# Patient Record
Sex: MALE | Race: WHITE | Employment: FULL TIME | URBAN - METROPOLITAN AREA
[De-identification: names, ages, dates, MRNs, and addresses within clinical notes are randomized per-mention and may not be internally consistent; named-entity substitution may affect disease eponyms.]

---

## 2019-12-22 ENCOUNTER — HOSPITAL ENCOUNTER (EMERGENCY)
Facility: CLINIC | Age: 24
Discharge: HOME | End: 2019-12-22
Attending: EMERGENCY MEDICINE | Admitting: EMERGENCY MEDICINE
Payer: COMMERCIAL

## 2019-12-22 ENCOUNTER — APPOINTMENT (OUTPATIENT)
Dept: CT IMAGING | Facility: CLINIC | Age: 24
End: 2019-12-22
Attending: EMERGENCY MEDICINE
Payer: COMMERCIAL

## 2019-12-22 VITALS
DIASTOLIC BLOOD PRESSURE: 52 MMHG | HEART RATE: 63 BPM | WEIGHT: 195 LBS | BODY MASS INDEX: 26.41 KG/M2 | OXYGEN SATURATION: 98 % | SYSTOLIC BLOOD PRESSURE: 124 MMHG | TEMPERATURE: 98.8 F | HEIGHT: 72 IN | RESPIRATION RATE: 16 BRPM

## 2019-12-22 DIAGNOSIS — B27.90 INFECTIOUS MONONUCLEOSIS WITHOUT COMPLICATION, INFECTIOUS MONONUCLEOSIS DUE TO UNSPECIFIED ORGANISM: ICD-10-CM

## 2019-12-22 DIAGNOSIS — J02.9 PHARYNGITIS, UNSPECIFIED ETIOLOGY: ICD-10-CM

## 2019-12-22 LAB
ALBUMIN SERPL-MCNC: 3.7 G/DL (ref 3.4–5)
ALP SERPL-CCNC: 95 U/L (ref 40–150)
ALT SERPL W P-5'-P-CCNC: 164 U/L (ref 0–70)
AST SERPL W P-5'-P-CCNC: 45 U/L (ref 0–45)
BASOPHILS # BLD AUTO: 0 10E9/L (ref 0–0.2)
BASOPHILS NFR BLD AUTO: 0 %
BILIRUB DIRECT SERPL-MCNC: 0.1 MG/DL (ref 0–0.2)
BILIRUB SERPL-MCNC: 0.6 MG/DL (ref 0.2–1.3)
DIFFERENTIAL METHOD BLD: ABNORMAL
EOSINOPHIL # BLD AUTO: 0 10E9/L (ref 0–0.7)
EOSINOPHIL NFR BLD AUTO: 0 %
ERYTHROCYTE [DISTWIDTH] IN BLOOD BY AUTOMATED COUNT: 12.4 % (ref 10–15)
HCT VFR BLD AUTO: 43.8 % (ref 40–53)
HETEROPH AB SER QL: POSITIVE
HGB BLD-MCNC: 15 G/DL (ref 13.3–17.7)
LYMPHOCYTES # BLD AUTO: 7.4 10E9/L (ref 0.8–5.3)
LYMPHOCYTES NFR BLD AUTO: 72 %
MCH RBC QN AUTO: 31.9 PG (ref 26.5–33)
MCHC RBC AUTO-ENTMCNC: 34.2 G/DL (ref 31.5–36.5)
MCV RBC AUTO: 93 FL (ref 78–100)
MONOCYTES # BLD AUTO: 0.4 10E9/L (ref 0–1.3)
MONOCYTES NFR BLD AUTO: 4 %
NEUTROPHILS # BLD AUTO: 2.5 10E9/L (ref 1.6–8.3)
NEUTROPHILS NFR BLD AUTO: 24 %
PLATELET # BLD AUTO: 231 10E9/L (ref 150–450)
PLATELET # BLD EST: ABNORMAL 10*3/UL
PROT SERPL-MCNC: 7 G/DL (ref 6.8–8.8)
RBC # BLD AUTO: 4.7 10E12/L (ref 4.4–5.9)
RBC MORPH BLD: ABNORMAL
SMUDGE CELLS BLD QL SMEAR: PRESENT
VARIANT LYMPHS BLD QL SMEAR: PRESENT
WBC # BLD AUTO: 10.3 10E9/L (ref 4–11)

## 2019-12-22 PROCEDURE — 25000128 H RX IP 250 OP 636: Performed by: EMERGENCY MEDICINE

## 2019-12-22 PROCEDURE — 25800030 ZZH RX IP 258 OP 636: Performed by: EMERGENCY MEDICINE

## 2019-12-22 PROCEDURE — 80076 HEPATIC FUNCTION PANEL: CPT | Performed by: EMERGENCY MEDICINE

## 2019-12-22 PROCEDURE — 85025 COMPLETE CBC W/AUTO DIFF WBC: CPT | Performed by: EMERGENCY MEDICINE

## 2019-12-22 PROCEDURE — 99285 EMERGENCY DEPT VISIT HI MDM: CPT | Mod: 25

## 2019-12-22 PROCEDURE — 25000132 ZZH RX MED GY IP 250 OP 250 PS 637: Performed by: EMERGENCY MEDICINE

## 2019-12-22 PROCEDURE — 70491 CT SOFT TISSUE NECK W/DYE: CPT

## 2019-12-22 PROCEDURE — 96365 THER/PROPH/DIAG IV INF INIT: CPT | Mod: 59

## 2019-12-22 PROCEDURE — 25000125 ZZHC RX 250: Performed by: EMERGENCY MEDICINE

## 2019-12-22 PROCEDURE — 86308 HETEROPHILE ANTIBODY SCREEN: CPT | Performed by: EMERGENCY MEDICINE

## 2019-12-22 PROCEDURE — 96375 TX/PRO/DX INJ NEW DRUG ADDON: CPT

## 2019-12-22 PROCEDURE — 96361 HYDRATE IV INFUSION ADD-ON: CPT

## 2019-12-22 RX ORDER — IOPAMIDOL 755 MG/ML
90 INJECTION, SOLUTION INTRAVASCULAR ONCE
Status: COMPLETED | OUTPATIENT
Start: 2019-12-22 | End: 2019-12-22

## 2019-12-22 RX ORDER — IBUPROFEN 600 MG/1
600 TABLET, FILM COATED ORAL EVERY 6 HOURS PRN
Qty: 30 TABLET | Refills: 1 | Status: SHIPPED | OUTPATIENT
Start: 2019-12-22 | End: 2019-12-31

## 2019-12-22 RX ORDER — SODIUM CHLORIDE 9 MG/ML
1000 INJECTION, SOLUTION INTRAVENOUS CONTINUOUS
Status: DISCONTINUED | OUTPATIENT
Start: 2019-12-22 | End: 2019-12-22 | Stop reason: HOSPADM

## 2019-12-22 RX ORDER — OXYCODONE HYDROCHLORIDE 5 MG/1
5 TABLET ORAL EVERY 6 HOURS PRN
Qty: 12 TABLET | Refills: 0 | Status: SHIPPED | OUTPATIENT
Start: 2019-12-22 | End: 2019-12-31

## 2019-12-22 RX ORDER — IBUPROFEN 200 MG
400 TABLET ORAL EVERY 4 HOURS PRN
COMMUNITY
End: 2019-12-31

## 2019-12-22 RX ORDER — IBUPROFEN 600 MG/1
600 TABLET, FILM COATED ORAL ONCE
Status: COMPLETED | OUTPATIENT
Start: 2019-12-22 | End: 2019-12-22

## 2019-12-22 RX ORDER — DEXAMETHASONE SODIUM PHOSPHATE 10 MG/ML
10 INJECTION, SOLUTION INTRAMUSCULAR; INTRAVENOUS ONCE
Status: COMPLETED | OUTPATIENT
Start: 2019-12-22 | End: 2019-12-22

## 2019-12-22 RX ORDER — ACETAMINOPHEN 500 MG
1000 TABLET ORAL ONCE
Status: COMPLETED | OUTPATIENT
Start: 2019-12-22 | End: 2019-12-22

## 2019-12-22 RX ORDER — PIPERACILLIN SODIUM, TAZOBACTAM SODIUM 3; .375 G/15ML; G/15ML
3.38 INJECTION, POWDER, LYOPHILIZED, FOR SOLUTION INTRAVENOUS ONCE
Status: COMPLETED | OUTPATIENT
Start: 2019-12-22 | End: 2019-12-22

## 2019-12-22 RX ORDER — CLINDAMYCIN HCL 300 MG
300 CAPSULE ORAL 3 TIMES DAILY
Qty: 40 CAPSULE | Refills: 0 | Status: SHIPPED | OUTPATIENT
Start: 2019-12-22 | End: 2019-12-31

## 2019-12-22 RX ADMIN — SODIUM CHLORIDE 60 ML: 9 INJECTION, SOLUTION INTRAVENOUS at 10:55

## 2019-12-22 RX ADMIN — IOPAMIDOL 90 ML: 755 INJECTION, SOLUTION INTRAVENOUS at 10:55

## 2019-12-22 RX ADMIN — SODIUM CHLORIDE 1000 ML: 9 INJECTION, SOLUTION INTRAVENOUS at 09:57

## 2019-12-22 RX ADMIN — DEXAMETHASONE SODIUM PHOSPHATE 10 MG: 10 INJECTION, SOLUTION INTRAMUSCULAR; INTRAVENOUS at 12:10

## 2019-12-22 RX ADMIN — IBUPROFEN 600 MG: 600 TABLET ORAL at 08:56

## 2019-12-22 RX ADMIN — PIPERACILLIN AND TAZOBACTAM 3.38 G: 3; .375 INJECTION, POWDER, LYOPHILIZED, FOR SOLUTION INTRAVENOUS at 10:01

## 2019-12-22 RX ADMIN — ACETAMINOPHEN 1000 MG: 500 TABLET, FILM COATED ORAL at 08:56

## 2019-12-22 ASSESSMENT — ENCOUNTER SYMPTOMS
HEADACHES: 0
MYALGIAS: 0
SORE THROAT: 1
FEVER: 0
FATIGUE: 0

## 2019-12-22 ASSESSMENT — MIFFLIN-ST. JEOR: SCORE: 1917.51

## 2019-12-22 NOTE — ED TRIAGE NOTES
Sore throat since Friday, pain with swallowing, Strep was negative that day. Given flonase and ibuprofen

## 2019-12-22 NOTE — ED PROVIDER NOTES
"  History     Chief Complaint:  Throat pain    The history is provided by the patient.      Rajesh Leon is a 24 year old male who presents for evaluation of throat pain. Two days ago, the patient began feeling the sensation of a \"golf ball\" in his throat. He went to the clinic at his work where he had a negative strep test. The clinic suggested he use ibuprofen and Flonase at home which did not help his symptoms, thus prompting presentation. He denies any fever, tiredness, runny nose, and ear/body/head ache. He did feel tired yesterday after taking \"a lot of ibuprofen\". He reports that he had no exposure to strep and is otherwise healthy. Additionally, the patient reports a cold last week but the symptoms have completely resolved.     Allergies:  No Known Allergies     Medications:    The patient is currently on no regular medications.     Past Medical History:    The patient denies any significant past medical history.    Past Surgical History:    The patient does not have any pertinent past surgical history.     Family History:    No past pertinent family history.    Social History:  Marital Status:  Single [1]  Presents to the ED by himself.  He lives in a house with three other males.  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  He does not know his primary care provider.  Negative for tobacco use.  Negative for alcohol use.  Negative for drug use.    Review of Systems   Constitutional: Negative for fatigue and fever.   HENT: Positive for sore throat. Negative for ear pain.    Musculoskeletal: Negative for myalgias.   Neurological: Negative for headaches.   All other systems reviewed and are negative.       Physical Exam     Patient Vitals for the past 24 hrs:   BP Temp Temp src Pulse Heart Rate Resp SpO2 Height Weight   12/22/19 0827 124/52 98.8  F (37.1  C) Temporal 63 63 16 98 % 1.829 m (6') 88.5 kg (195 lb)       Physical Exam  Constitutional:  Oriented to person, place, and time.      Appears well-developed and " well-nourished.      Appears comfortable.  HENT:   Head:    Normocephalic and atraumatic.   Right Ear:   Tympanic membrane and external ear normal.   Left Ear:   Tympanic membrane and external ear normal.   Mouth/Throat:   Oropharynx is clear and moist.      Mucous membranes are normal.      Soft tissue swelling of right  pharynx w/o obvious abscess.     Enlarged right tonsil.     No uvular deviation  Eyes:    Conjunctivae normal and EOM are normal.      Pupils are equal, round, and reactive to light.   Neck:    Normal range of motion. Neck supple. Anterior cervical adenopathy.   Cardiovascular:  Normal rate, regular rhythm, S1 normal and S2 normal.      No gallop and no friction rub. No murmur heard.  Pulmonary/Chest:  Breath sounds normal. No respiratory distress.      No wheezes. No rhonchi. No rales.   Abdominal:   Soft. No hepatosplenomegaly. No tenderness.      No rebound and no CVA tenderness.   Musculoskeletal:  Normal range of motion.   Neurological:   Alert and oriented to person, place, and time. Normal strength.      GCS eye subscore is 4. GCS verbal subscore is 5.      GCS motor subscore is 6.   Skin:    Skin is warm and dry.   Psychiatric:   Normal mood and affect.      Speech is normal and behavior is normal.      Judgment and thought content normal.      Cognition and memory are normal.     Emergency Department Course     Imaging:  Radiology findings were communicated with the patient who voiced understanding of the findings.    CT soft tissue neck w/ IV contrast:   1.The right palatine tonsil is asymmetrically enlarged and contains  multiple subcentimeter areas of hypoattenuation appear to represent  phlegmon or fluid within tonsillar crypts.   2. A 1.3 cm area of fluid is present along the inferior margin of the  right palatine tonsil within or along the right glossotonsillar  sulcus.  3. Cervical lymphadenopathy, right worse than left, likely reactive, as per radiology.    Laboratory:  Laboratory  findings were communicated with the patient who voiced understanding of the findings.    CBC: WBC: 10.3, HGB: 15.0, PLT: 231  Mononucleosis screen: Positive (A)   Hepatic Panel: ALT: 164 (H), o/w WNL    Procedures:  None     Interventions:  0856 Tylenol, 1000 mg, PO   Ibuprofen, 600 mg, PO  0957 NS 1L IV  1001 Zosyn, 3.375 g, IV   1210 Decadron, 10 mg, IV injection    Emergency Department Course:  Past medical records, nursing notes, and vitals reviewed.    0935 I performed an exam of the patient as documented above.     IV was inserted and blood was drawn for laboratory testing, results above.  The patient was sent for a neck CT scan while in the emergency department, results above.     1117 The patient tested positive for mono.    1153 I rechecked the patient and discussed the results of his workup thus far.     1207 I consulted with Dr. Santacruz, ENT, regarding the patient's history and presentation here in the emergency department.     Findings and plan explained to the Patient. Patient discharged home with instructions regarding supportive care, medications, and reasons to return. The importance of close follow-up was reviewed. The patient was prescribed Cleocin, Advil/Motrin, and Roxicodone.    I personally reviewed the laboratory and imaging results with the Patient and answered all related questions prior to discharge.     Impression & Plan     Medical Decision Making:  The patient is a healthy 24-year-old whose had 2 days of sore throat mainly on the right.  He has been seen with a negative strep 2 days ago.  He really denies any other systemic complaints.  He does have moderate enlargement of the right tonsil with some erythema in the palatal arch without obvious abscess.  He did have bilateral anterior cervical adenopathy.  He has no hepatosplenomegaly and otherwise appears nontoxic.  His mono screen is positive.  Due to the unilateral swelling, I did do CT which showed findings as above.  The larger  fluid collection is not a usual peritonsillar area which we would drain.  At this point, I have talked to ENT and will have him follow-up tomorrow for reassessment.  He is been treated with IV antibiotics and will go home with antibiotics.  In light of the mono, I will avoid the amoxicillin and Augmentins.  I will give him clindamycin.  I discussed this potential side effects of clindamycin including diarrhea including C. difficile and to discontinue if that is present.  I called the patient back with his liver function tests with the ALT being mildly elevated.  He is advised to limit Tylenol and not use alcohol.  He is also been advised not to engage in significant physical activity.  He should be follow-up the end of the week for reevaluation.  I did talk to ENT Dr. Santacruz and they will see him in the clinic tomorrow for reevaluation.  He can follow-up sooner if worse.    Diagnosis:    ICD-10-CM    1. Infectious mononucleosis without complication, infectious mononucleosis due to unspecified organism B27.90 Hepatic panel     Hepatic panel     CANCELED: Hepatic panel   2. Pharyngitis, unspecified etiology J02.9        Disposition:  Discharged to home.    Discharge Medications:  Discharge Medication List as of 12/22/2019 12:02 PM      START taking these medications    Details   clindamycin (CLEOCIN) 300 MG capsule Take 1 capsule (300 mg) by mouth 3 times daily for 7 days, Disp-40 capsule, R-0, Local Print      !! ibuprofen (ADVIL/MOTRIN) 600 MG tablet Take 1 tablet (600 mg) by mouth every 6 hours as needed for moderate pain, Disp-30 tablet, R-1, Local Print      oxyCODONE (ROXICODONE) 5 MG tablet Take 1 tablet (5 mg) by mouth every 6 hours as needed for pain, Disp-12 tablet, R-0, Local Print       !! - Potential duplicate medications found. Please discuss with provider.          Scribe Disclosure:  BARAK, Maribell Tao, am serving as a scribe at 9:35 AM on 12/22/2019 to document services personally performed by  Ness Lozoya MD based on my observations and the provider's statements to me.   I, Devorah Mancuso, am serving as a scribe on 12/22/2019 at 11:36 AM to personally document services performed by Ness Lozoya MD based on my observations and the provider's statements to me.         EMERGENCY DEPARTMENT       Ness Lozoya MD  12/22/19 4547

## 2019-12-22 NOTE — ED AVS SNAPSHOT
Emergency Department  [address] [facility] [address]  Phone:  [phone]  Fax:  [phone]                                    Rajesh Leon   MRN: [id number]    Department:   Emergency Department   Date of Visit:  12/22/2019           After Visit Summary Signature Page    I have received my discharge instructions, and my questions have been answered. I have discussed any challenges I see with this plan with the nurse or doctor.    ..........................................................................................................................................  Patient/Patient Representative Signature      ..........................................................................................................................................  Patient Representative Print Name and Relationship to Patient    ..................................................               ................................................  Date                                   Time    ..........................................................................................................................................  Reviewed by Signature/Title    ...................................................              ..............................................  Date                                               Time          22EPIC Rev 08/18

## 2019-12-24 ENCOUNTER — OFFICE VISIT (OUTPATIENT)
Dept: FAMILY MEDICINE | Facility: CLINIC | Age: 24
End: 2019-12-24
Payer: COMMERCIAL

## 2019-12-24 VITALS
WEIGHT: 195.3 LBS | HEART RATE: 67 BPM | BODY MASS INDEX: 26.45 KG/M2 | DIASTOLIC BLOOD PRESSURE: 73 MMHG | OXYGEN SATURATION: 96 % | SYSTOLIC BLOOD PRESSURE: 132 MMHG | TEMPERATURE: 97.2 F | HEIGHT: 72 IN

## 2019-12-24 DIAGNOSIS — B27.80 OTHER INFECTIOUS MONONUCLEOSIS WITHOUT COMPLICATION: ICD-10-CM

## 2019-12-24 PROCEDURE — 99203 OFFICE O/P NEW LOW 30 MIN: CPT | Performed by: INTERNAL MEDICINE

## 2019-12-24 ASSESSMENT — MIFFLIN-ST. JEOR: SCORE: 1918.87

## 2019-12-24 NOTE — PROGRESS NOTES
Subjective     Rajesh Leon is a 24 year old male who presents to clinic today for the following health issues:    HPI   ED/UC Followup:    Facility:   ED  Date of visit: 12/22/2019  Reason for visit:     Infectious mononucleosis without complication, infectious mononucleosis due to unspecified organism  Pharyngitis, unspecified etiology    Current Status: patient states that he is doing a lot better now since discharge. Only concern today is his liver test.   Reviewed emergency room evaluation and it is clindamycin.  His function test abnormalities were noted and need follow-up.  Patient states that he is doing much better and feels relatively back to normal without a sore throat, difficulty with swallowing or night sweats or feverishness.        Current Outpatient Medications   Medication Sig Dispense Refill     ibuprofen (ADVIL/MOTRIN) 200 MG tablet Take 400 mg by mouth every 4 hours as needed for mild pain       ibuprofen (ADVIL/MOTRIN) 600 MG tablet Take 1 tablet (600 mg) by mouth every 6 hours as needed for moderate pain 30 tablet 1     oxyCODONE (ROXICODONE) 5 MG tablet Take 1 tablet (5 mg) by mouth every 6 hours as needed for pain 12 tablet 0     No Known Allergies    Reviewed and updated as needed this visit by Provider         Review of Systems   ROS COMP: Constitutional, HEENT, cardiovascular, pulmonary, gi and gu systems are negative, except as otherwise noted.      Objective    There were no vitals taken for this visit.  There is no height or weight on file to calculate BMI.  Physical Exam /73 (BP Location: Left arm, Patient Position: Sitting, Cuff Size: Adult Regular)   Pulse 67   Temp 97.2  F (36.2  C) (Tympanic)   Ht 1.829 m (6')   Wt 88.6 kg (195 lb 4.8 oz)   SpO2 96%   BMI 26.49 kg/m      GENERAL: healthy, alert and no distress  HEENT ears normal tympanic membranes, throat was clear with minimal without associated erythema enlargement of his tonsils   NECK: no adenopathy, no  asymmetry, masses, or scars and thyroid normal to palpation  RESP: lungs clear to auscultation - no rales, rhonchi or wheezes  CV: regular rate and rhythm, normal S1 S2, no S3 or S4, no murmur, click or rub, no peripheral edema and peripheral pulses strong  ABDOMEN: soft, nontender, no hepatosplenomegaly, no masses and bowel sounds normal  MS: no gross musculoskeletal defects noted, no edema            Assessment & Plan    1.  Acute mononucleosis  Recommended discontinuing the clindamycin and the oxycodone.  Patient to continue with good hygiene and recommended resuming regular weight lifting activities and his exercise program in a stepwise fashion in 1 to 2 weeks anticipating that he will take a month before he gets back to his usual regimen.  Follow-up LFTs in 1 month  BMI:   Estimated body mass index is 26.49 kg/m  as calculated from the following:    Height as of this encounter: 1.829 m (6').    Weight as of this encounter: 88.6 kg (195 lb 4.8 oz).           FUTURE APPOINTMENTS:       - Follow-up visit in 1 month to reevaluate his liver function tests    No follow-ups on file.    Nicolas Lewis MD  [facility]

## 2019-12-30 PROBLEM — B27.80 OTHER INFECTIOUS MONONUCLEOSIS WITHOUT COMPLICATION: Status: ACTIVE | Noted: 2019-12-30

## 2019-12-31 ENCOUNTER — OFFICE VISIT (OUTPATIENT)
Dept: FAMILY MEDICINE | Facility: CLINIC | Age: 24
End: 2019-12-31
Payer: COMMERCIAL

## 2019-12-31 VITALS
OXYGEN SATURATION: 97 % | BODY MASS INDEX: 25.77 KG/M2 | HEART RATE: 74 BPM | WEIGHT: 190 LBS | TEMPERATURE: 98.1 F | SYSTOLIC BLOOD PRESSURE: 113 MMHG | DIASTOLIC BLOOD PRESSURE: 65 MMHG

## 2019-12-31 DIAGNOSIS — B27.90 INFECTIOUS MONONUCLEOSIS WITHOUT COMPLICATION, INFECTIOUS MONONUCLEOSIS DUE TO UNSPECIFIED ORGANISM: Primary | ICD-10-CM

## 2019-12-31 PROCEDURE — 36415 COLL VENOUS BLD VENIPUNCTURE: CPT | Performed by: NURSE PRACTITIONER

## 2019-12-31 PROCEDURE — 84460 ALANINE AMINO (ALT) (SGPT): CPT | Performed by: NURSE PRACTITIONER

## 2019-12-31 PROCEDURE — 99213 OFFICE O/P EST LOW 20 MIN: CPT | Performed by: NURSE PRACTITIONER

## 2019-12-31 NOTE — PROGRESS NOTES
Subjective     Rajesh Leon is a 24 year old male who presents to clinic today for the following health issues:    HPI   Chief Complaint   Patient presents with     RECHECK     mono f/u, wants labs run to make sure liver and spleen are ok   mother wants liver enzyme rechecked  Pt wants to go back to working out withour restrictions  States he feels he has kicked mono in short time, no fever, no N/V, no diarrhea, normal appetite, no ST and no swollen lymph glands  Patient Active Problem List   Diagnosis     Other infectious mononucleosis without complication     History reviewed. No pertinent surgical history.    Social History     Tobacco Use     Smoking status: Never Smoker     Smokeless tobacco: Never Used   Substance Use Topics     Alcohol use: No     History reviewed. No pertinent family history.      No current outpatient medications on file.     No Known Allergies    Reviewed and updated as needed this visit by Provider    Review of Systems   ROS COMP: Constitutional, HEENT, cardiovascular, pulmonary, gi and gu systems are negative, except as otherwise noted.      Objective    /65 (BP Location: Right arm, Cuff Size: Adult Large)   Pulse 74   Temp 98.1  F (36.7  C) (Tympanic)   Wt 86.2 kg (190 lb)   SpO2 97%   BMI 25.77 kg/m    Body mass index is 25.77 kg/m .  Physical Exam   GENERAL: healthy, alert and no distress  EYES: Eyes grossly normal to inspection, PERRL and conjunctivae and sclerae normal  HENT: ear canals and TM's normal, nose and mouth without ulcers or lesions, lips appear dry   NECK: right posterior adenopathy, masses, or scars and thyroid normal to palpation  RESP: lungs clear to auscultation - no rales, rhonchi or wheezes  CV: regular rate and rhythm, normal S1 S2, no S3 or S4, no murmur, click or rub,   ABDOMEN: soft, nontender, no hepatosplenomegaly, no masses and bowel sounds normal  MS: no gross musculoskeletal defects noted, no edema    Diagnostic Test Results:  Labs  reviewed in Epic        Assessment & Plan       ICD-10-CM    1. Infectious mononucleosis without complication, infectious mononucleosis due to unspecified organism B27.90 ALT          Increase fluid intake  Eat fresh foods  Avoid the gym and exercise for 2 weeks    ALEXIS Lord [facility]

## 2020-01-02 LAB — ALT SERPL W P-5'-P-CCNC: 84 U/L (ref 0–70)

## 2020-05-12 ENCOUNTER — VIRTUAL VISIT (OUTPATIENT)
Dept: FAMILY MEDICINE | Facility: CLINIC | Age: 25
End: 2020-05-12
Payer: COMMERCIAL

## 2020-05-12 DIAGNOSIS — J02.0 STREP THROAT: ICD-10-CM

## 2020-05-12 DIAGNOSIS — Z87.09 HISTORY OF SORE THROAT: Primary | ICD-10-CM

## 2020-05-12 PROCEDURE — 99213 OFFICE O/P EST LOW 20 MIN: CPT | Mod: GT | Performed by: INTERNAL MEDICINE

## 2020-05-12 NOTE — PROGRESS NOTES
"Rajesh Leon is a 25 year old male who is being evaluated via a billable video visit.      The patient has been notified of following:     \"This video visit will be conducted via a call between you and your physician/provider. We have found that certain health care needs can be provided without the need for an in-person physical exam.  This service lets us provide the care you need with a video conversation.  If a prescription is necessary we can send it directly to your pharmacy.  If lab work is needed we can place an order for that and you can then stop by our lab to have the test done at a later time.    Video visits are billed at different rates depending on your insurance coverage.  Please reach out to your insurance provider with any questions.    If during the course of the call the physician/provider feels a video visit is not appropriate, you will not be charged for this service.\"    Patient has given verbal consent for Video visit? Yes    How would you like to obtain your AVS? Mail a copy    Patient would like the video invitation sent by: Text to cell phone: [phone]    Will anyone else be joining your video visit? No        Subjective     Rajesh Leon is a 25 year old male who presents today via video visit for the following health issues:    HPI  Patient is presenting via video visit requesting strep testing.  Patient describes that he had symptoms of mono couple months ago and he is concerned if he is a strep carrier, currently has no symptoms whatsoever.  He said that he had contact with 2 females partners over the last 2 weeks and was tested positive for strep.  Patient denies any swallowing problem.  Tonsils are not enlarged or painful.  He thinks also that he could have coronavirus symptoms 2 months ago he self Quarantined himself at that time, he he also describes could not smell at that time.  He has no no penicillin allergy.  Denies currently any cough, runny nose, fever, " chills and no sore throat, he occasionally smokes denies any lung or heart conditions, denies any diabetes.  Video Start Time: 12:33 PM      Patient Active Problem List   Diagnosis     Other infectious mononucleosis without complication     History reviewed. No pertinent surgical history.    Social History     Tobacco Use     Smoking status: Never Smoker     Smokeless tobacco: Never Used   Substance Use Topics     Alcohol use: No     No family history on file.      No current outpatient medications on file.     No Known Allergies  Recent Labs   Lab Test 12/31/19  1156 12/22/19  0956 02/17/14  1840   ALT 84* 164* 44   CR  --   --  0.80   GFRESTIMATED  --   --  >90   GFRESTBLACK  --   --  >90   POTASSIUM  --   --  4.0      BP Readings from Last 3 Encounters:   12/31/19 113/65   12/24/19 132/73   12/22/19 124/52    Wt Readings from Last 3 Encounters:   12/31/19 86.2 kg (190 lb)   12/24/19 88.6 kg (195 lb 4.8 oz)   12/22/19 88.5 kg (195 lb)                    Reviewed and updated as needed this visit by Provider  Tobacco  Allergies  Meds  Med Hx  Surg Hx  Soc Hx        Review of Systems   Constitutional, HEENT, cardiovascular, pulmonary, gi and gu systems are negative, except as otherwise noted.      Objective    There were no vitals taken for this visit.  Estimated body mass index is 25.77 kg/m  as calculated from the following:    Height as of 12/24/19: 1.829 m (6').    Weight as of 12/31/19: 86.2 kg (190 lb).  Physical Exam     GENERAL: Healthy, alert and no distress  EYES: Eyes grossly normal to inspection.  No discharge or erythema, or obvious scleral/conjunctival abnormalities.  RESP: No audible wheeze, cough, or visible cyanosis.  No visible retractions or increased work of breathing.    SKIN: Visible skin clear. No significant rash, abnormal pigmentation or lesions.  NEURO: Cranial nerves grossly intact.  Mentation and speech appropriate for age.  PSYCH: Mentation appears normal, affect normal/bright,  judgement and insight intact, normal speech and appearance well-groomed.      Diagnostic Test Results:  Labs reviewed in Epic        Assessment & Plan   Problem List Items Addressed This Visit     None      Visit Diagnoses     History of sore throat    -  Primary    Relevant Orders    Beta strep group A culture    Streptococcus A Rapid Scr w Reflx to PCR         Advised patient would recommend strep testing before we decide on treating him with antibiotics to confirm whether he is or not a carrier of strep.  Currently has no symptoms whatsoever he is generally healthy advised to keep well-hydrated keep and  continue to take measures precautions to avoid contact with COVID patient's although he states that he could possibly had symptoms of COVID-19 2 months ago.  But now he is asymptomatic.    BMI:   Estimated body mass index is 25.77 kg/m  as calculated from the following:    Height as of 12/24/19: 1.829 m (6').    Weight as of 12/31/19: 86.2 kg (190 lb).           See Patient Instructions  Return in about 4 weeks (around 6/9/2020), or if symptoms worsen or fail to improve.    Duyen Mckeon MD  [facility]      Video-Visit Details    Type of service:  Video Visit    Video End Time:12:53 PM    Originating Location (pt. Location): Home    Distant Location (provider location):  [facility]     Platform used for Video Visit: Doximity    Return in about 4 weeks (around 6/9/2020), or if symptoms worsen or fail to improve.       Duyen Mckeon MD

## 2020-05-13 ENCOUNTER — ALLIED HEALTH/NURSE VISIT (OUTPATIENT)
Dept: NURSING | Facility: CLINIC | Age: 25
End: 2020-05-13
Payer: COMMERCIAL

## 2020-05-13 DIAGNOSIS — B27.80 OTHER INFECTIOUS MONONUCLEOSIS WITHOUT COMPLICATION: Primary | ICD-10-CM

## 2020-05-13 LAB
DEPRECATED S PYO AG THROAT QL EIA: NEGATIVE
SPECIMEN SOURCE: ABNORMAL
SPECIMEN SOURCE: NORMAL
STREP GROUP A PCR: ABNORMAL

## 2020-05-13 PROCEDURE — 87651 STREP A DNA AMP PROBE: CPT | Performed by: INTERNAL MEDICINE

## 2020-05-13 PROCEDURE — 40001204 ZZHCL STATISTIC STREP A RAPID: Performed by: INTERNAL MEDICINE

## 2020-05-13 PROCEDURE — 99213 OFFICE O/P EST LOW 20 MIN: CPT | Mod: GT

## 2020-05-13 RX ORDER — AMOXICILLIN 875 MG
875 TABLET ORAL 2 TIMES DAILY
Qty: 20 TABLET | Refills: 0 | Status: SHIPPED | OUTPATIENT
Start: 2020-05-13

## 2020-05-14 NOTE — RESULT ENCOUNTER NOTE
Rajesh, the group A strep confirmatory test with PCR was positive/abnormal.  I did send a prescription of amoxicillin to [facility] 875 mg twice a day for 10 days.  Dr. Mckeon

## 2021-01-15 ENCOUNTER — HEALTH MAINTENANCE LETTER (OUTPATIENT)
Age: 26
End: 2021-01-15

## 2021-10-24 ENCOUNTER — HEALTH MAINTENANCE LETTER (OUTPATIENT)
Age: 26
End: 2021-10-24

## 2022-02-13 ENCOUNTER — HEALTH MAINTENANCE LETTER (OUTPATIENT)
Age: 27
End: 2022-02-13

## 2022-10-15 ENCOUNTER — HEALTH MAINTENANCE LETTER (OUTPATIENT)
Age: 27
End: 2022-10-15

## 2023-03-26 ENCOUNTER — HEALTH MAINTENANCE LETTER (OUTPATIENT)
Age: 28
End: 2023-03-26

## 2024-05-26 ENCOUNTER — HEALTH MAINTENANCE LETTER (OUTPATIENT)
Age: 29
End: 2024-05-26